# Patient Record
Sex: MALE | Race: BLACK OR AFRICAN AMERICAN | NOT HISPANIC OR LATINO | URBAN - METROPOLITAN AREA
[De-identification: names, ages, dates, MRNs, and addresses within clinical notes are randomized per-mention and may not be internally consistent; named-entity substitution may affect disease eponyms.]

---

## 2020-01-15 ENCOUNTER — OFFICE (OUTPATIENT)
Dept: URBAN - METROPOLITAN AREA CLINIC 101 | Facility: CLINIC | Age: 30
End: 2020-01-15

## 2020-01-15 VITALS
WEIGHT: 273 LBS | HEIGHT: 73 IN | DIASTOLIC BLOOD PRESSURE: 79 MMHG | TEMPERATURE: 98.4 F | HEART RATE: 84 BPM | SYSTOLIC BLOOD PRESSURE: 129 MMHG

## 2020-01-15 DIAGNOSIS — R11.10 VOMITING, UNSPECIFIED: ICD-10-CM

## 2020-01-15 DIAGNOSIS — Z79.1 LONG TERM (CURRENT) USE OF NON-STEROIDAL ANTI-INFLAMMATORIES: ICD-10-CM

## 2020-01-15 DIAGNOSIS — R14.2 ERUCTATION: ICD-10-CM

## 2020-01-15 DIAGNOSIS — R10.13 EPIGASTRIC PAIN: ICD-10-CM

## 2020-01-15 DIAGNOSIS — F17.200 NICOTINE DEPENDENCE, UNSPECIFIED, UNCOMPLICATED: ICD-10-CM

## 2020-01-15 DIAGNOSIS — R12 HEARTBURN: ICD-10-CM

## 2020-01-15 PROCEDURE — 99244 OFF/OP CNSLTJ NEW/EST MOD 40: CPT

## 2020-01-15 RX ORDER — ALUMINUM HYDROXIDE, MAGNESIUM HYDROXIDE,SIMETHICONE 400; 400; 40 MG/5ML; MG/5ML; MG/5ML
LIQUID ORAL
Qty: 60 | Refills: 1 | Status: COMPLETED
Start: 2020-01-15 | End: 2021-01-07

## 2020-01-15 RX ORDER — OMEPRAZOLE 40 MG/1
CAPSULE, DELAYED RELEASE ORAL
Qty: 30 | Refills: 1 | Status: COMPLETED
Start: 2020-01-15 | End: 2021-01-07

## 2020-01-15 NOTE — SERVICEHPINOTES
ELENA RUBIN   is a   29   year old  male who is being seen in consultation at the request of   RAVI GREEN   for GERD symptoms. He states lower mid sternal chest discomfort described as "burning" sensation that lasts several minutes (up to 1 hr), occurs about daily and worse since this past week along with belching, reflux, regurgitation, and non-bloody emesis x 6 events on Saturday. He also notes epigastric pain described as "burning" that is present all the time, so eating very little secondary to pain. He notes last week meal with "[place] onion soup" flared his symptoms. Not taking any PPIs or H2RB at this time. Prior use of Lansoprazole in 2016. Moderate chronic NSAID use with Motrin 200 mg 2 pills daily for headaches. Prior EGD in 2016 by Dr Estevez that was normal and benign bx. He states asthma is well controlled and not taking any medications for this condition. No known cardiac disease. He is tobacco smoker: 1/2 pack per day.  Denies fevers, nausea, dysphagia, lower abdominal pain, melena, rectal bleeding, weight loss. No other complaints.

## 2020-01-21 ENCOUNTER — OFFICE (OUTPATIENT)
Dept: URBAN - METROPOLITAN AREA CLINIC 100 | Facility: CLINIC | Age: 30
End: 2020-01-21

## 2020-01-21 VITALS
HEART RATE: 74 BPM | RESPIRATION RATE: 15 BRPM | RESPIRATION RATE: 17 BRPM | SYSTOLIC BLOOD PRESSURE: 114 MMHG | HEIGHT: 73 IN | HEART RATE: 75 BPM | RESPIRATION RATE: 16 BRPM | OXYGEN SATURATION: 96 % | WEIGHT: 273 LBS | DIASTOLIC BLOOD PRESSURE: 67 MMHG | HEART RATE: 73 BPM | SYSTOLIC BLOOD PRESSURE: 142 MMHG | DIASTOLIC BLOOD PRESSURE: 66 MMHG | DIASTOLIC BLOOD PRESSURE: 59 MMHG | RESPIRATION RATE: 18 BRPM | DIASTOLIC BLOOD PRESSURE: 62 MMHG | HEART RATE: 92 BPM | DIASTOLIC BLOOD PRESSURE: 69 MMHG | SYSTOLIC BLOOD PRESSURE: 134 MMHG | SYSTOLIC BLOOD PRESSURE: 125 MMHG | TEMPERATURE: 97.9 F | SYSTOLIC BLOOD PRESSURE: 124 MMHG | OXYGEN SATURATION: 100 % | OXYGEN SATURATION: 98 % | HEART RATE: 69 BPM | SYSTOLIC BLOOD PRESSURE: 108 MMHG | OXYGEN SATURATION: 99 % | HEART RATE: 82 BPM | DIASTOLIC BLOOD PRESSURE: 70 MMHG | HEART RATE: 56 BPM | TEMPERATURE: 97.8 F

## 2020-01-21 DIAGNOSIS — R14.2 ERUCTATION: ICD-10-CM

## 2020-01-21 DIAGNOSIS — R11.10 VOMITING, UNSPECIFIED: ICD-10-CM

## 2020-01-21 DIAGNOSIS — K31.89 OTHER DISEASES OF STOMACH AND DUODENUM: ICD-10-CM

## 2020-01-21 DIAGNOSIS — K29.60 OTHER GASTRITIS WITHOUT BLEEDING: ICD-10-CM

## 2020-01-21 DIAGNOSIS — R10.13 EPIGASTRIC PAIN: ICD-10-CM

## 2020-01-21 LAB
GI UPPER EGD HISOLOGY - SPECM 1 JAR(S): 1: (no result)
GI UPPER EGD HISOLOGY - SPECM 1 JAR(S): 1: PDF REPORT: (no result)

## 2020-01-21 PROCEDURE — 43239 EGD BIOPSY SINGLE/MULTIPLE: CPT

## 2020-01-21 NOTE — INTERFACERESULTNOTES
biopsies from stomach show mild chronic inflammation (gastritis) but no other abnormality; no H pylori, no cancerous or precancerous changes. This is most likely due to excess stomach acid and exacerbated by tobacco use. Continue with medication recommended at office visit and following procedure. Routine follow up with [facility] in 4+ weeks.

## 2020-11-13 ENCOUNTER — OFFICE (OUTPATIENT)
Dept: URBAN - METROPOLITAN AREA CLINIC 78 | Facility: CLINIC | Age: 30
End: 2020-11-13

## 2020-11-13 PROCEDURE — 00014: CPT

## 2021-01-07 ENCOUNTER — OFFICE (OUTPATIENT)
Dept: URBAN - METROPOLITAN AREA CLINIC 34 | Facility: CLINIC | Age: 31
End: 2021-01-07

## 2021-01-07 VITALS — TEMPERATURE: 97.5 F | WEIGHT: 241 LBS | HEIGHT: 73 IN

## 2021-01-07 DIAGNOSIS — R10.32 LEFT LOWER QUADRANT PAIN: ICD-10-CM

## 2021-01-07 DIAGNOSIS — R12 HEARTBURN: ICD-10-CM

## 2021-01-07 DIAGNOSIS — R11.0 NAUSEA: ICD-10-CM

## 2021-01-07 PROCEDURE — 99214 OFFICE O/P EST MOD 30 MIN: CPT | Performed by: NURSE PRACTITIONER

## 2021-01-07 RX ORDER — ESOMEPRAZOLE MAGNESIUM 40 MG/1
CAPSULE, DELAYED RELEASE ORAL
Qty: 30 | Refills: 5 | Status: COMPLETED
Start: 2021-01-07 | End: 2024-06-20 | Stop reason: SDUPTHER

## 2021-01-07 RX ORDER — ONDANSETRON HYDROCHLORIDE 4 MG/1
TABLET, FILM COATED ORAL
Qty: 30 | Refills: 2 | Status: COMPLETED
Start: 2021-01-07 | End: 2024-06-27

## 2021-01-07 NOTE — SERVICEHPINOTES
ELENA RUBIN   is a   30   male who presents with abdominal pain. Has been experiencing abdominal pain since Nov 2020. The pain is located at LLQ. Visited ER twice, first thought to be kidney stone related. CT of abdomen and pelvis in Nov was unremarkable. BRPT reports having another CT of abdomen and pelvis with contrast by PCP. BR+ Nausea without vomiting. Denies dysphagia. Mild stomach pain.  BR+ Acid reflux, takes TUMS prn.  BRThe pain is worse with pressure and during bowel movement. Denies blood in stool.  BRDark stool x 1 about a week ago. Had loose bowel last week then it became solid. Weight loss 20 lbs within a month. BR

## 2021-02-04 PROBLEM — K29.70 GASTRITIS, UNSPECIFIED, WITHOUT BLEEDING: Status: ACTIVE | Noted: 2020-01-21

## 2024-06-07 ENCOUNTER — OFFICE (OUTPATIENT)
Dept: URBAN - METROPOLITAN AREA CLINIC 102 | Facility: CLINIC | Age: 34
End: 2024-06-07
Payer: COMMERCIAL

## 2024-06-07 VITALS
WEIGHT: 251 LBS | HEIGHT: 73 IN | TEMPERATURE: 97.9 F | HEART RATE: 79 BPM | SYSTOLIC BLOOD PRESSURE: 159 MMHG | SYSTOLIC BLOOD PRESSURE: 146 MMHG | DIASTOLIC BLOOD PRESSURE: 101 MMHG | DIASTOLIC BLOOD PRESSURE: 90 MMHG

## 2024-06-07 DIAGNOSIS — R10.13 EPIGASTRIC PAIN: ICD-10-CM

## 2024-06-07 DIAGNOSIS — R10.32 LEFT LOWER QUADRANT PAIN: ICD-10-CM

## 2024-06-07 DIAGNOSIS — F12.90 CANNABIS USE, UNSPECIFIED, UNCOMPLICATED: ICD-10-CM

## 2024-06-07 DIAGNOSIS — Z80.0 FAMILY HISTORY OF MALIGNANT NEOPLASM OF DIGESTIVE ORGANS: ICD-10-CM

## 2024-06-07 DIAGNOSIS — R11.10 VOMITING, UNSPECIFIED: ICD-10-CM

## 2024-06-07 DIAGNOSIS — R19.4 CHANGE IN BOWEL HABIT: ICD-10-CM

## 2024-06-07 DIAGNOSIS — R11.2 NAUSEA WITH VOMITING, UNSPECIFIED: ICD-10-CM

## 2024-06-07 DIAGNOSIS — R12 HEARTBURN: ICD-10-CM

## 2024-06-07 DIAGNOSIS — Z83.79 FAMILY HISTORY OF OTHER DISEASES OF THE DIGESTIVE SYSTEM: ICD-10-CM

## 2024-06-07 PROCEDURE — 99204 OFFICE O/P NEW MOD 45 MIN: CPT | Performed by: NURSE PRACTITIONER

## 2024-06-07 RX ORDER — ONDANSETRON 4 MG/1
TABLET, ORALLY DISINTEGRATING ORAL
Qty: 18 | Refills: 2 | Status: ACTIVE

## 2024-06-07 RX ORDER — ESOMEPRAZOLE MAGNESIUM 40 MG/1
CAPSULE, DELAYED RELEASE ORAL
Qty: 30 | Refills: 5 | Status: ACTIVE
Start: 2024-06-07

## 2024-06-07 NOTE — SERVICEHPINOTES
ELENA RUBIN   is a   34   male who complains of epigastric abdominal pain has been an ongoing issue since 2020.  + n/v  daily. Rubbing makes it better, warm water.  Cold water makes it worse. Marijuana helps the stomach pain. when he vomits it will "calms pain and I don't vomit"br
brDenies hematemesis, dysphagia, blood in stool, melena, coffee ground emesis. br
br Has a BM 1 time a day, BSS between 4-6br
br Maternal uncle has stomach cancer. 
br Family hisotry of CRC
br Mothers side has Crohn's disease 
br
br Having reflux daily, burning in chest and lays down will have regurgitation. br
br has lost weight due to not eating as he is afraid to vomit. br
br went to ER 4/18/24 and 4/22.  br
br Uses cocaine once every 2-3 months. br

brDrinks one glass of wine 1 time a week, was drinking  daily  and stopped in April. br
Does not see a cardiologist no blood thinners,

## 2024-06-27 ENCOUNTER — OFFICE (OUTPATIENT)
Dept: URBAN - METROPOLITAN AREA CLINIC 30 | Facility: CLINIC | Age: 34
End: 2024-06-27
Payer: COMMERCIAL

## 2024-06-27 ENCOUNTER — OFFICE (OUTPATIENT)
Dept: URBAN - METROPOLITAN AREA PATHOLOGY 3 | Facility: PATHOLOGY | Age: 34
End: 2024-06-27
Payer: COMMERCIAL

## 2024-06-27 VITALS
DIASTOLIC BLOOD PRESSURE: 79 MMHG | OXYGEN SATURATION: 97 % | TEMPERATURE: 97.9 F | SYSTOLIC BLOOD PRESSURE: 105 MMHG | HEART RATE: 60 BPM | DIASTOLIC BLOOD PRESSURE: 73 MMHG | DIASTOLIC BLOOD PRESSURE: 62 MMHG | RESPIRATION RATE: 5 BRPM | TEMPERATURE: 97.6 F | HEART RATE: 77 BPM | RESPIRATION RATE: 14 BRPM | DIASTOLIC BLOOD PRESSURE: 56 MMHG | SYSTOLIC BLOOD PRESSURE: 121 MMHG | HEART RATE: 79 BPM | SYSTOLIC BLOOD PRESSURE: 131 MMHG | OXYGEN SATURATION: 99 % | DIASTOLIC BLOOD PRESSURE: 65 MMHG | WEIGHT: 251 LBS | OXYGEN SATURATION: 95 % | SYSTOLIC BLOOD PRESSURE: 128 MMHG | RESPIRATION RATE: 12 BRPM | SYSTOLIC BLOOD PRESSURE: 85 MMHG | SYSTOLIC BLOOD PRESSURE: 90 MMHG | RESPIRATION RATE: 23 BRPM | DIASTOLIC BLOOD PRESSURE: 60 MMHG | SYSTOLIC BLOOD PRESSURE: 117 MMHG | HEART RATE: 90 BPM | OXYGEN SATURATION: 79 % | HEART RATE: 85 BPM | DIASTOLIC BLOOD PRESSURE: 67 MMHG | SYSTOLIC BLOOD PRESSURE: 122 MMHG | HEIGHT: 73 IN | RESPIRATION RATE: 18 BRPM | DIASTOLIC BLOOD PRESSURE: 55 MMHG

## 2024-06-27 DIAGNOSIS — R10.32 LEFT LOWER QUADRANT PAIN: ICD-10-CM

## 2024-06-27 DIAGNOSIS — K63.5 POLYP OF COLON: ICD-10-CM

## 2024-06-27 DIAGNOSIS — R11.2 NAUSEA WITH VOMITING, UNSPECIFIED: ICD-10-CM

## 2024-06-27 DIAGNOSIS — R10.13 EPIGASTRIC PAIN: ICD-10-CM

## 2024-06-27 DIAGNOSIS — K31.89 OTHER DISEASES OF STOMACH AND DUODENUM: ICD-10-CM

## 2024-06-27 PROCEDURE — 88312 SPECIAL STAINS GROUP 1: CPT | Performed by: PATHOLOGY

## 2024-06-27 PROCEDURE — 88313 SPECIAL STAINS GROUP 2: CPT | Performed by: PATHOLOGY

## 2024-06-27 PROCEDURE — 88305 TISSUE EXAM BY PATHOLOGIST: CPT | Performed by: PATHOLOGY

## 2024-06-27 RX ORDER — PANTOPRAZOLE SODIUM 40 MG/1
TABLET, DELAYED RELEASE ORAL
Qty: 30 | Refills: 5 | Status: ACTIVE
Start: 2024-06-27